# Patient Record
Sex: FEMALE | Race: BLACK OR AFRICAN AMERICAN | NOT HISPANIC OR LATINO | URBAN - METROPOLITAN AREA
[De-identification: names, ages, dates, MRNs, and addresses within clinical notes are randomized per-mention and may not be internally consistent; named-entity substitution may affect disease eponyms.]

---

## 2022-04-30 ENCOUNTER — TELEPHONE ENCOUNTER (OUTPATIENT)
Dept: URBAN - METROPOLITAN AREA CLINIC 121 | Facility: CLINIC | Age: 72
End: 2022-04-30

## 2022-05-01 ENCOUNTER — TELEPHONE ENCOUNTER (OUTPATIENT)
Dept: URBAN - METROPOLITAN AREA CLINIC 121 | Facility: CLINIC | Age: 72
End: 2022-05-01

## 2022-05-01 RX ORDER — IBANDRONATE SODIUM 150 MG/1
1 MONTHLY TABLET, FILM COATED ORAL
Status: ACTIVE | COMMUNITY
Start: 2019-04-18

## 2022-05-01 RX ORDER — ROSUVASTATIN CALCIUM 10 MG/1
QD TABLET, FILM COATED ORAL
Status: ACTIVE | COMMUNITY
Start: 2019-04-18

## 2022-05-01 RX ORDER — SODIUM SULFATE, POTASSIUM SULFATE, MAGNESIUM SULFATE 17.5; 3.13; 1.6 G/ML; G/ML; G/ML
MIX AND USE AS DIRECTED SOLUTION, CONCENTRATE ORAL
Status: ACTIVE | COMMUNITY
Start: 2019-04-22

## 2022-05-01 RX ORDER — HYDROCHLOROTHIAZIDE 25 MG/1
QD TABLET ORAL
Status: ACTIVE | COMMUNITY
Start: 2019-04-18

## 2024-07-01 ENCOUNTER — LAB OUTSIDE AN ENCOUNTER (OUTPATIENT)
Dept: URBAN - METROPOLITAN AREA SURGERY CENTER 7 | Facility: SURGERY CENTER | Age: 74
End: 2024-07-01

## 2024-07-09 ENCOUNTER — OFFICE VISIT (OUTPATIENT)
Dept: URBAN - METROPOLITAN AREA SURGERY CENTER 7 | Facility: SURGERY CENTER | Age: 74
End: 2024-07-09

## 2024-07-24 ENCOUNTER — OFFICE VISIT (OUTPATIENT)
Dept: URBAN - METROPOLITAN AREA CLINIC 27 | Facility: CLINIC | Age: 74
End: 2024-07-24

## 2024-08-08 ENCOUNTER — OFFICE VISIT (OUTPATIENT)
Dept: URBAN - METROPOLITAN AREA SURGERY CENTER 7 | Facility: SURGERY CENTER | Age: 74
End: 2024-08-08

## 2025-02-26 ENCOUNTER — DASHBOARD ENCOUNTERS (OUTPATIENT)
Age: 75
End: 2025-02-26

## 2025-02-26 ENCOUNTER — OFFICE VISIT (OUTPATIENT)
Dept: URBAN - METROPOLITAN AREA CLINIC 29 | Facility: CLINIC | Age: 75
End: 2025-02-26
Payer: COMMERCIAL

## 2025-02-26 VITALS
HEART RATE: 103 BPM | WEIGHT: 161 LBS | BODY MASS INDEX: 25.88 KG/M2 | DIASTOLIC BLOOD PRESSURE: 64 MMHG | SYSTOLIC BLOOD PRESSURE: 93 MMHG | HEIGHT: 66 IN

## 2025-02-26 DIAGNOSIS — K63.5 POLYP OF COLON: ICD-10-CM

## 2025-02-26 PROCEDURE — 99203 OFFICE O/P NEW LOW 30 MIN: CPT | Performed by: INTERNAL MEDICINE

## 2025-02-26 RX ORDER — HYDROCHLOROTHIAZIDE 25 MG/1
QD TABLET ORAL
Status: ACTIVE | COMMUNITY
Start: 2019-04-18

## 2025-02-26 RX ORDER — SODIUM SULFATE, POTASSIUM SULFATE, MAGNESIUM SULFATE 17.5; 3.13; 1.6 G/ML; G/ML; G/ML
MIX AND USE AS DIRECTED SOLUTION, CONCENTRATE ORAL
Status: ACTIVE | COMMUNITY
Start: 2019-04-22

## 2025-02-26 RX ORDER — ONDANSETRON HYDROCHLORIDE 4 MG/1
1 TABLET TABLET, FILM COATED ORAL
Qty: 2 | Refills: 0 | OUTPATIENT
Start: 2025-02-26

## 2025-02-26 RX ORDER — ROSUVASTATIN CALCIUM 10 MG/1
QD TABLET, FILM COATED ORAL
Status: ACTIVE | COMMUNITY
Start: 2019-04-18

## 2025-02-26 RX ORDER — IBANDRONATE SODIUM 150 MG/1
1 MONTHLY TABLET, FILM COATED ORAL
Status: ACTIVE | COMMUNITY
Start: 2019-04-18

## 2025-02-26 RX ORDER — SODIUM, POTASSIUM,MAG SULFATES 17.5-3.13G
177ML SOLUTION, RECONSTITUTED, ORAL ORAL
Qty: 1 KIT | Refills: 0 | OUTPATIENT
Start: 2025-02-26 | End: 2025-02-27

## 2025-02-26 NOTE — HPI-TODAY'S VISIT:
is a 74-year-old woman who presents today per request by Dr. Pollack for colon cancer screening.  She had  a colonoscopy 2019.  She has a history of TAs.  She does not currently have any GI problems. She had daily bowel movements without straining.

## 2025-03-05 ENCOUNTER — LAB OUTSIDE AN ENCOUNTER (OUTPATIENT)
Dept: URBAN - METROPOLITAN AREA CLINIC 29 | Facility: CLINIC | Age: 75
End: 2025-03-05

## 2025-03-25 ENCOUNTER — OFFICE VISIT (OUTPATIENT)
Dept: URBAN - METROPOLITAN AREA SURGERY CENTER 7 | Facility: SURGERY CENTER | Age: 75
End: 2025-03-25
